# Patient Record
Sex: MALE
[De-identification: names, ages, dates, MRNs, and addresses within clinical notes are randomized per-mention and may not be internally consistent; named-entity substitution may affect disease eponyms.]

---

## 2023-03-02 ENCOUNTER — NURSE TRIAGE (OUTPATIENT)
Dept: OTHER | Facility: CLINIC | Age: 43
End: 2023-03-02

## 2023-03-03 NOTE — TELEPHONE ENCOUNTER
Location of patient: [place]    Subjective: Caller states he is having heart palpitations intermittently for a week. This afternoon around 1830 he started feeling faint. HR was 110 at that time. Patient described the feeling as \"Flutters\" in chest. Happening for a couple seconds but will happen 4-5 times over a min. Resting heart rate is typically between 70-85 bpm. No personal history of cardiac issues but father passed away from heart attack. There is no change when it comes to walking or sitting. Denies: unusual sweating, difficulty breathing, confusion, slurred speech. Current Symptoms: Heart palpitations    Onset: 1 week ago; gradual    Pain Severity: Denies pain - states its fluttering feeling    Recommended disposition: See HCP within 4 Hours (or PCP triage)    Care advice provided, patient verbalizes understanding; denies any other questions or concerns; instructed to call back for any new or worsening symptoms. This triage is a result of a call to [address] [place] [address]. Please do not respond to the triage nurse through this encounter. Any subsequent communication should be directly with the patient.     Reason for Disposition   [1] Skipped or extra beat(s) AND [2] occurs 4 or more times per minute    Protocols used: Heart Rate and Heartbeat Questions-ADULT-